# Patient Record
Sex: FEMALE | Race: WHITE | NOT HISPANIC OR LATINO | Employment: OTHER | URBAN - METROPOLITAN AREA
[De-identification: names, ages, dates, MRNs, and addresses within clinical notes are randomized per-mention and may not be internally consistent; named-entity substitution may affect disease eponyms.]

---

## 2021-08-20 ENCOUNTER — TELEPHONE (OUTPATIENT)
Dept: INTERNAL MEDICINE | Facility: CLINIC | Age: 66
End: 2021-08-20

## 2021-10-18 ENCOUNTER — TELEPHONE (OUTPATIENT)
Dept: INTERNAL MEDICINE | Facility: CLINIC | Age: 66
End: 2021-10-18

## 2021-10-18 DIAGNOSIS — E55.9 VITAMIN D DEFICIENCY: Primary | ICD-10-CM

## 2021-10-18 DIAGNOSIS — Z13.220 SCREENING CHOLESTEROL LEVEL: ICD-10-CM

## 2021-10-18 NOTE — TELEPHONE ENCOUNTER
Scheduled patient for virtual visit to establish care in January when she goes on Medicare. She would like to do labwork prior to her visit when she is in the [facility]. If agreeable, please enter orders for routine labwork.

## 2021-12-25 ENCOUNTER — HEALTH MAINTENANCE LETTER (OUTPATIENT)
Age: 66
End: 2021-12-25

## 2022-01-12 ENCOUNTER — LAB (OUTPATIENT)
Dept: LAB | Facility: CLINIC | Age: 67
End: 2022-01-12
Payer: MEDICARE

## 2022-01-12 DIAGNOSIS — E61.1 IRON DEFICIENCY: ICD-10-CM

## 2022-01-12 DIAGNOSIS — Z13.220 SCREENING CHOLESTEROL LEVEL: ICD-10-CM

## 2022-01-12 DIAGNOSIS — E55.9 VITAMIN D DEFICIENCY: ICD-10-CM

## 2022-01-12 DIAGNOSIS — K58.2 MIXED IRRITABLE BOWEL SYNDROME: ICD-10-CM

## 2022-01-12 LAB
ANION GAP SERPL CALCULATED.3IONS-SCNC: 9 MMOL/L (ref 5–18)
BUN SERPL-MCNC: 10 MG/DL (ref 8–22)
CALCIUM SERPL-MCNC: 9 MG/DL (ref 8.5–10.5)
CHLORIDE BLD-SCNC: 107 MMOL/L (ref 98–107)
CHOLEST SERPL-MCNC: 207 MG/DL
CO2 SERPL-SCNC: 23 MMOL/L (ref 22–31)
CREAT SERPL-MCNC: 0.74 MG/DL (ref 0.6–1.1)
FASTING STATUS PATIENT QL REPORTED: YES
GFR SERPL CREATININE-BSD FRML MDRD: 89 ML/MIN/1.73M2
GLUCOSE BLD-MCNC: 83 MG/DL (ref 70–125)
HDLC SERPL-MCNC: 62 MG/DL
LDLC SERPL CALC-MCNC: 128 MG/DL
POTASSIUM BLD-SCNC: 4.3 MMOL/L (ref 3.5–5)
SODIUM SERPL-SCNC: 139 MMOL/L (ref 136–145)
TRIGL SERPL-MCNC: 85 MG/DL

## 2022-01-12 PROCEDURE — 82306 VITAMIN D 25 HYDROXY: CPT

## 2022-01-12 PROCEDURE — 80061 LIPID PANEL: CPT

## 2022-01-12 PROCEDURE — 36415 COLL VENOUS BLD VENIPUNCTURE: CPT

## 2022-01-12 PROCEDURE — 84443 ASSAY THYROID STIM HORMONE: CPT

## 2022-01-12 PROCEDURE — 80048 BASIC METABOLIC PNL TOTAL CA: CPT

## 2022-01-13 LAB — DEPRECATED CALCIDIOL+CALCIFEROL SERPL-MC: 69 UG/L (ref 30–80)

## 2022-01-17 ENCOUNTER — VIRTUAL VISIT (OUTPATIENT)
Dept: INTERNAL MEDICINE | Facility: CLINIC | Age: 67
End: 2022-01-17
Payer: MEDICARE

## 2022-01-17 DIAGNOSIS — Z11.4 SCREENING FOR HUMAN IMMUNODEFICIENCY VIRUS: ICD-10-CM

## 2022-01-17 DIAGNOSIS — E55.9 VITAMIN D DEFICIENCY: ICD-10-CM

## 2022-01-17 DIAGNOSIS — I77.0 A-V FISTULA (H): ICD-10-CM

## 2022-01-17 DIAGNOSIS — E61.1 IRON DEFICIENCY: ICD-10-CM

## 2022-01-17 DIAGNOSIS — Z78.0 ASYMPTOMATIC MENOPAUSAL STATE: ICD-10-CM

## 2022-01-17 DIAGNOSIS — Z23 NEED FOR 23-POLYVALENT PNEUMOCOCCAL POLYSACCHARIDE VACCINE: ICD-10-CM

## 2022-01-17 DIAGNOSIS — Z12.31 VISIT FOR SCREENING MAMMOGRAM: ICD-10-CM

## 2022-01-17 DIAGNOSIS — K58.2 MIXED IRRITABLE BOWEL SYNDROME: ICD-10-CM

## 2022-01-17 DIAGNOSIS — Z12.11 COLON CANCER SCREENING: ICD-10-CM

## 2022-01-17 DIAGNOSIS — Z11.59 ENCOUNTER FOR HEPATITIS C SCREENING TEST FOR LOW RISK PATIENT: ICD-10-CM

## 2022-01-17 DIAGNOSIS — D64.9 ANEMIA, UNSPECIFIED TYPE: ICD-10-CM

## 2022-01-17 DIAGNOSIS — Z00.00 MEDICARE ANNUAL WELLNESS VISIT, INITIAL: Primary | ICD-10-CM

## 2022-01-17 DIAGNOSIS — G47.00 PERSISTENT DISORDER OF INITIATING OR MAINTAINING SLEEP: ICD-10-CM

## 2022-01-17 PROBLEM — H81.11 BPPV (BENIGN PAROXYSMAL POSITIONAL VERTIGO), RIGHT: Status: RESOLVED | Noted: 2021-07-01 | Resolved: 2022-01-17

## 2022-01-17 PROBLEM — Z86.73 HISTORY OF TIA (TRANSIENT ISCHEMIC ATTACK): Status: ACTIVE | Noted: 2017-07-27

## 2022-01-17 PROBLEM — R94.39 ABNORMAL STRESS TEST: Status: ACTIVE | Noted: 2017-07-31

## 2022-01-17 PROBLEM — Q24.5 ANOMALOUS RIGHT CORONARY ARTERY: Status: ACTIVE | Noted: 2021-03-19

## 2022-01-17 PROBLEM — Z86.16 HISTORY OF 2019 NOVEL CORONAVIRUS DISEASE (COVID-19): Status: ACTIVE | Noted: 2021-03-19

## 2022-01-17 PROBLEM — H81.11 BPPV (BENIGN PAROXYSMAL POSITIONAL VERTIGO), RIGHT: Status: ACTIVE | Noted: 2021-07-01

## 2022-01-17 LAB — TSH SERPL DL<=0.005 MIU/L-ACNC: 1.03 UIU/ML (ref 0.3–5)

## 2022-01-17 PROCEDURE — 99204 OFFICE O/P NEW MOD 45 MIN: CPT | Mod: 95 | Performed by: INTERNAL MEDICINE

## 2022-01-17 PROCEDURE — 99207 PR INITIAL PREVENTIVE EXAM STAT: CPT | Mod: 95 | Performed by: INTERNAL MEDICINE

## 2022-01-17 RX ORDER — MECLIZINE HYDROCHLORIDE 25 MG/1
25 TABLET ORAL
COMMUNITY
Start: 2021-06-12 | End: 2022-01-17

## 2022-01-17 RX ORDER — FERROUS SULFATE 325(65) MG
1 TABLET ORAL
COMMUNITY
Start: 1980-08-16 | End: 2022-01-17

## 2022-01-17 RX ORDER — AMITRIPTYLINE HYDROCHLORIDE 10 MG/1
10 TABLET ORAL AT BEDTIME
Qty: 10 TABLET | Refills: 0 | Status: SHIPPED | OUTPATIENT
Start: 2022-01-17 | End: 2022-02-07

## 2022-01-17 NOTE — PROGRESS NOTES
ANNUAL WELLNESS VISIT--Video    Norma is a 67 year old female contacting the clinic today via video, who will use the platform: [employer] for the visit.  Phone # for [employer], or if Amwell drops:   Telephone Information:   Mobile [phone]          Assessment and Plan:     1. Medicare annual wellness visit, initial  Reviewed vaccination.  Conservative colon cancer screening is reasonable  - REVIEW OF HEALTH MAINTENANCE PROTOCOL ORDERS  - DEXA HIP/PELVIS/SPINE - Future; Future    2. 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  Agree with conservative care.  Minimally symptomatic  - CBC with Platelets & Differential; Future  - Vitamin B12; Future    3. Vitamin D deficiency    4. Iron deficiency  Trial without iron and update indices.  Recurrent anemia would indicate different problems postmenopausal  - CBC with Platelets & Differential; Future  - TSH; Future    5. Visit for screening mammogram  - MA Screening Digital Bilateral; Future    6. Colon cancer screening  Conservative screening is reasonable  - COLOGUARD(EXACT SCIENCES); Future    7. Encounter for hepatitis C screening test for low risk patient  - Hepatitis C antibody; Future    8. Screening for human immunodeficiency virus  - HIV Antigen Antibody Combo; Future    9. Anemia, unspecified type  Recheck and stop iron  - CBC with Platelets & Differential; Future  - Ferritin; Future  - CRP inflammation; Future  - Erythrocyte sedimentation rate auto; Future    10. Asymptomatic menopausal state   - DEXA HIP/PELVIS/SPINE - Future; Future    11. Mixed irritable bowel syndrome   Possible side effect of iron or vitamins  - TSH; Future    12. Persistent disorder of initiating or maintaining sleep  - amitriptyline (ELAVIL) 10 MG tablet; Take 1 tablet (10 mg) by mouth At Bedtime for 10 days  Dispense: 10 tablet; Refill: 0    13. Need for 23-polyvalent pneumococcal polysaccharide vaccine  Recommend Pneumovax     Preventive  Care Assessed: As above     Patient Instructions   Update labs    Pneumovax 23    Amitriptyline 10 mg at bed    Discontinue iron    Check hemoglobin and iron indices    Mammogram    Bone density    Cologuard      Medication list carefully reviewed and corrected  Epic Merger Problem list addressed and updated     Return in about 6 months (around 7/17/2022) for using a video visit.    The patient's current medical problems were reviewed.    I have had an Advance Directives discussion with the patient.    I have had an Advance Directives discussion with the patient.        The following health maintenance items are reviewed in Epic and correct as of today:  Health Maintenance Due   Topic Date Due     DEXA  Never done     MAMMO SCREENING  Never done     HIV SCREENING  Never done     HEPATITIS C SCREENING  Never done     ZOSTER IMMUNIZATION (1 of 2) Never done     COLORECTAL CANCER SCREENING  09/12/2017     INFLUENZA VACCINE (1) 09/01/2021     PHQ-2  Never done     FALL RISK ASSESSMENT  Never done     Pneumococcal Vaccine: 65+ Years (1 of 1 - PPSV23) 01/12/2022       Appropriate preventive services were discussed with this patient, including applicable screening as appropriate for cardiovascular disease, diabetes, osteopenia/osteoporosis, and glaucoma.  As appropriate for age/gender, discussed screening for colorectal cancer, prostate cancer, breast cancer, and cervical cancer. Checklist reviewing preventive services available has been given to the patient.    Reviewed patients plan of care and provided an AVS. The Basic Care Plan for Norma meets the Care Plan requirement. This Care Plan has been established and reviewed with the Patient, and printed in the AVS.    The following health maintenance schedule was reviewed with the patient and provided in printed form in the after visit summary:   Health Maintenance   Topic Date Due     DEXA  Never done     MAMMO SCREENING  Never done     HIV SCREENING  Never done      HEPATITIS C SCREENING  Never done     ZOSTER IMMUNIZATION (1 of 2) Never done     COLORECTAL CANCER SCREENING  09/12/2017     INFLUENZA VACCINE (1) 09/01/2021     PHQ-2  Never done     FALL RISK ASSESSMENT  Never done     Pneumococcal Vaccine: 65+ Years (1 of 1 - PPSV23) 01/12/2022     COVID-19 Vaccine (3 - Booster for Pfizer series) 02/23/2022     DTAP/TDAP/TD IMMUNIZATION (3 - Td or Tdap) 12/11/2022     MEDICARE ANNUAL WELLNESS VISIT  01/17/2023     ANNUAL REVIEW OF HM ORDERS  01/17/2023     LIPID  01/12/2027     ADVANCE CARE PLANNING  01/17/2027     Pneumococcal Vaccine: Pediatrics (0 to 5 Years) and At-Risk Patients (6 to 64 Years)  Aged Out     IPV IMMUNIZATION  Aged Out     MENINGITIS IMMUNIZATION  Aged Out     HEPATITIS B IMMUNIZATION  Aged Out        Subjective:   Norma is a 67 year old female contacting the clinic via video today for an Annual Wellness Visit.    CHIEF COMPLAINT:  Chief Complaint   Patient presents with     Physical     Medicare wellness       HPI: Doing very well.  Needs cancer screening updated    Having some trouble with sleep maintenance and sometimes sleep onset.  Feels that she does not have much stress    Long history of irritable bowel type symptoms.  Long history of iron usage with history of rapid amine anemia but none since menopause    Underwent evaluation 2017 for abnormal stress test.  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  Managed conservatively with yearly echoes    Review of Systems: Some shortness of breath with exercise    Patient Care Team:  Mitul Aldana MD as PCP - General (Internal Medicine)     Patient Active Problem List   Diagnosis     Shoulder pain     Other postprocedural status(V45.89)     A-V fistula (H)     Abnormal stress test     Anomalous right coronary artery     History of 2019 novel coronavirus disease (COVID-19)     History of TIA (transient ischemic attack)     Iron deficiency     No past medical  history on file.   No past surgical history on file.   No family history on file.   Social History     Socioeconomic History     Marital status:      Spouse name: Not on file     Number of children: Not on file     Years of education: Not on file     Highest education level: Not on file   Occupational History     Not on file   Tobacco Use     Smoking status: Not on file     Smokeless tobacco: Not on file   Substance and Sexual Activity     Alcohol use: Not on file     Drug use: Not on file     Sexual activity: Not on file   Other Topics Concern     Not on file   Social History Narrative     Not on file     Social Determinants of Health     Financial Resource Strain: Not on file   Food Insecurity: Not on file   Transportation Needs: Not on file   Physical Activity: Not on file   Stress: Not on file   Social Connections: Not on file   Intimate Partner Violence: Not on file   Housing Stability: Not on file      Social History     Social History Narrative     Not on file       Current Outpatient Medications   Medication Sig Dispense Refill     amitriptyline (ELAVIL) 10 MG tablet Take 1 tablet (10 mg) by mouth At Bedtime for 10 days 10 tablet 0     cholecalciferol 125 MCG (5000 UT) CAPS Take 5,000 Units by mouth        Objective:   There were no vitals taken for this visit. There is no height or weight on file to calculate BMI.    VisionScreening:  No exam data present     PHYSICAL EXAM:  Alert and oriented.  Setting: In-home.    No obvious cyanoses    Recent Labs   Lab Test 01/12/22  1119   CHOL 207*   GLC 83   VITDT 69       Lab Results   Component Value Date    VITDT 69 01/12/2022       Recent Results (from the past 720 hour(s))   Lipid panel reflex to direct LDL Fasting    Collection Time: 01/12/22 11:19 AM   Result Value Ref Range    Cholesterol 207 (H) <=199 mg/dL    Triglycerides 85 <=149 mg/dL    Direct Measure HDL 62 >=50 mg/dL    LDL Cholesterol Calculated 128 <=129 mg/dL    Patient Fasting > 8hrs? Yes     Basic metabolic panel    Collection Time: 01/12/22 11:19 AM   Result Value Ref Range    Sodium 139 136 - 145 mmol/L    Potassium 4.3 3.5 - 5.0 mmol/L    Chloride 107 98 - 107 mmol/L    Carbon Dioxide (CO2) 23 22 - 31 mmol/L    Anion Gap 9 5 - 18 mmol/L    Urea Nitrogen 10 8 - 22 mg/dL    Creatinine 0.74 0.60 - 1.10 mg/dL    Calcium 9.0 8.5 - 10.5 mg/dL    Glucose 83 70 - 125 mg/dL    GFR Estimate 89 >60 mL/min/1.73m2   Vitamin D Deficiency    Collection Time: 01/12/22 11:19 AM   Result Value Ref Range    Vitamin D, Total (25-Hydroxy) 69 30 - 80 ug/L   TSH    Collection Time: 01/12/22 11:19 AM   Result Value Ref Range    TSH 1.03 0.30 - 5.00 uIU/mL       No flowsheet data found.  Cognitive Screening   1) Repeat 3 items (Leader, Season, Table)    2) Clock draw: NORMAL  3) 3 item recall: Recalls 3 objects  Results: 3 items recalled: COGNITIVE IMPAIRMENT LESS LIKELY    Mini-CogTM Copyright S Brianne. Licensed by the author for use in [facility]; reprinted with permission (josh@[facility]). All rights reserved.    A Mini-Cog score of 0-2 suggests the possibility of dementia, score of 3-5 suggests no dementia    ROOMING STAFF:    Patient has been advised of split billing requirements and indicates understanding: Yes   Are you in the first 12 months of your Medicare coverage?  yes    HPI  Do you feel safe in your environment? Yes      Have you ever done Advance Care Planning? (For example, a Health Directive, POLST, or a discussion with a medical provider or your loved ones about your wishes): Yes, advance care planning is on file.    Do you have sleep apnea, excessive snoring or daytime drowsiness?: no    Reviewed and updated as needed this visit by clinical staff    Meds  Problems              Video Start Time: 9:46 AM  Video End time:  10:43 AM  Face to face plus orders: 57 minutes  Documentation time:  3 minutes    The visit lasted a total of 60 minutes     Patient has given verbal consent to a Video  visit?  Yes  How would you like to obtain your AVS?  MyChart  Will anyone else be joining your video visit? No       Video-Visit Details  Type of service:  Video Visit  Originating Location (pt. Location): Home  Distant Location (provider location):    [facility]    Mitul Aldana MD  [facility]

## 2022-01-17 NOTE — PATIENT INSTRUCTIONS
Update labs    Pneumovax 23    Amitriptyline 10 mg at bed    Discontinue iron    Check hemoglobin and iron indices    Mammogram    Bone density    Cologuard

## 2022-01-18 NOTE — PROGRESS NOTES
This encounter was created solely for the purpose of releasing the future order that was placed for Cologuard.  This is a necessary step in order for the results to be abstracted once they are available.  Farhat Arevalo

## 2022-01-24 ENCOUNTER — TRANSFERRED RECORDS (OUTPATIENT)
Dept: HEALTH INFORMATION MANAGEMENT | Facility: CLINIC | Age: 67
End: 2022-01-24
Payer: MEDICARE

## 2022-01-24 LAB — COLOGUARD-ABSTRACT: NEGATIVE

## 2022-02-18 ENCOUNTER — ANCILLARY PROCEDURE (OUTPATIENT)
Dept: BONE DENSITY | Facility: CLINIC | Age: 67
End: 2022-02-18
Attending: INTERNAL MEDICINE
Payer: MEDICARE

## 2022-02-18 ENCOUNTER — ALLIED HEALTH/NURSE VISIT (OUTPATIENT)
Dept: FAMILY MEDICINE | Facility: CLINIC | Age: 67
End: 2022-02-18
Payer: MEDICARE

## 2022-02-18 ENCOUNTER — ANCILLARY PROCEDURE (OUTPATIENT)
Dept: MAMMOGRAPHY | Facility: CLINIC | Age: 67
End: 2022-02-18
Attending: INTERNAL MEDICINE
Payer: MEDICARE

## 2022-02-18 DIAGNOSIS — Z23 ENCOUNTER FOR IMMUNIZATION: Primary | ICD-10-CM

## 2022-02-18 DIAGNOSIS — Z78.0 ASYMPTOMATIC MENOPAUSAL STATE: ICD-10-CM

## 2022-02-18 DIAGNOSIS — Z12.31 VISIT FOR SCREENING MAMMOGRAM: ICD-10-CM

## 2022-02-18 DIAGNOSIS — Z00.00 MEDICARE ANNUAL WELLNESS VISIT, INITIAL: ICD-10-CM

## 2022-02-18 PROCEDURE — 90670 PCV13 VACCINE IM: CPT

## 2022-02-18 PROCEDURE — G0009 ADMIN PNEUMOCOCCAL VACCINE: HCPCS

## 2022-02-18 PROCEDURE — 77080 DXA BONE DENSITY AXIAL: CPT | Performed by: INTERNAL MEDICINE

## 2022-02-18 PROCEDURE — 99207 PR NO CHARGE NURSE ONLY: CPT

## 2022-02-18 PROCEDURE — 77067 SCR MAMMO BI INCL CAD: CPT | Mod: TC | Performed by: RADIOLOGY

## 2022-02-19 ENCOUNTER — HEALTH MAINTENANCE LETTER (OUTPATIENT)
Age: 67
End: 2022-02-19

## 2022-05-18 DIAGNOSIS — L82.0 INFLAMED SEBORRHEIC KERATOSIS: ICD-10-CM

## 2022-05-18 DIAGNOSIS — D22.70 MELANOCYTIC NEVUS OF LOWER EXTREMITY, UNSPECIFIED LATERALITY: Primary | ICD-10-CM

## 2022-07-20 DIAGNOSIS — L23.7 CONTACT DERMATITIS DUE TO POISON IVY: Primary | ICD-10-CM

## 2022-07-20 RX ORDER — CLOBETASOL PROPIONATE 0.5 MG/G
CREAM TOPICAL 2 TIMES DAILY
Qty: 30 G | Refills: 1 | Status: SHIPPED | OUTPATIENT
Start: 2022-07-20 | End: 2023-07-18

## 2022-09-07 ENCOUNTER — OFFICE VISIT (OUTPATIENT)
Dept: DERMATOLOGY | Facility: CLINIC | Age: 67
End: 2022-09-07
Attending: INTERNAL MEDICINE
Payer: MEDICARE

## 2022-09-07 DIAGNOSIS — D23.9 DERMATOFIBROMA: ICD-10-CM

## 2022-09-07 DIAGNOSIS — L82.1 SEBORRHEIC KERATOSIS: Primary | ICD-10-CM

## 2022-09-07 DIAGNOSIS — D22.9 MULTIPLE BENIGN NEVI: ICD-10-CM

## 2022-09-07 DIAGNOSIS — L81.4 LENTIGO: ICD-10-CM

## 2022-09-07 DIAGNOSIS — D18.01 CHERRY ANGIOMA: ICD-10-CM

## 2022-09-07 PROCEDURE — 99203 OFFICE O/P NEW LOW 30 MIN: CPT | Performed by: PHYSICIAN ASSISTANT

## 2022-09-07 ASSESSMENT — PAIN SCALES - GENERAL: PAINLEVEL: NO PAIN (0)

## 2022-09-07 NOTE — LETTER
9/7/2022         RE: Norma Espinosa  [address]  [place] [address]        Dear Colleague,    Thank you for referring your patient, Norma Espinosa, to the [facility]. Please see a copy of my visit note below.    Norma Espinosa is an extremely pleasant 67 year old year old female patient here today for skin check. She notes newer spots on legs over past years. No painful or bleeding areas of skin. She denies any tanning bed usage or blistering sunburns. She does use sunscreen. Patient has no other skin complaints today.  Remainder of the HPI, Meds, PMH, Allergies, FH, and SH was reviewed in chart.    Pertinent Hx:   No personal history of skin cancer. Family history of skin cancer.   No past medical history on file.    No past surgical history on file.     No family history on file.    Social History     Socioeconomic History     Marital status:      Spouse name: Not on file     Number of children: Not on file     Years of education: Not on file     Highest education level: Not on file   Occupational History     Not on file   Tobacco Use     Smoking status: Never Smoker     Smokeless tobacco: Never Used   Substance and Sexual Activity     Alcohol use: Not on file     Drug use: Not on file     Sexual activity: Not on file   Other Topics Concern     Not on file   Social History Narrative     Not on file     Social Determinants of Health     Financial Resource Strain: Not on file   Food Insecurity: Not on file   Transportation Needs: Not on file   Physical Activity: Not on file   Stress: Not on file   Social Connections: Not on file   Intimate Partner Violence: Not on file   Housing Stability: Not on file       Outpatient Encounter Medications as of 9/7/2022   Medication Sig Dispense Refill     amitriptyline (ELAVIL) 10 MG tablet Take 1 tablet (10 mg) by mouth At Bedtime 90 tablet 3     cholecalciferol 125 MCG (5000 UT) CAPS Take 5,000 Units by mouth       clobetasol  (TEMOVATE) 0.05 % external cream Apply topically 2 times daily (Patient not taking: Reported on 9/7/2022) 30 g 1     No facility-administered encounter medications on file as of 9/7/2022.             O:   NAD, WDWN, Alert & Oriented, Mood & Affect wnl, Vitals stable   Here today alone   There were no vitals taken for this visit.   General appearance normal   Vitals stable   Alert, oriented and in no acute distress     Brown firm papule with positive dimple sign on left thigh   Stuck on papules and brown macules on trunk and ext   Red papules on trunk  Brown papules and macules with regular pigment network and borders on torso and extremities     The remainder of skin exam is normal     Eyes: Conjunctivae/lids:Normal     ENT: Lips: normal    MSK:Normal    Cardiovascular: peripheral edema none    Pulm: Breathing Normal    Neuro/Psych: Orientation:Alert and Orientedx3 ; Mood/Affect:normal   A/P:  1. Seborrheic keratosis, lentigo, angioma, benign nevi, dermatofibroma   It was a pleasure speaking to Norma Espinosa today.  BENIGN LESIONS DISCUSSED WITH PATIENT:  I discussed the specifics of tumor, prognosis, and genetics of benign lesions.  I explained that treatment of these lesions would be purely cosmetic and not medically neccessary.  I discussed with patient different removal options including excision, cautery and /or laser.      Nature and genetics of benign skin lesions dicussed with patient.  Signs and Symptoms of skin cancer discussed with patient.  ABCDEs of melanoma reviewed with patient.  Patient encouraged to perform monthly skin exams.  UV precautions reviewed with patient.  Patient to follow up with Primary Care provider regarding elevated blood pressure.  Skin care regimen reviewed with patient: Eliminate harsh soaps, i.e. Dial, zest, irsih spring; Mild soaps such as Cetaphil or Dove sensitive skin, avoid hot or cold showers, aggressive use of emollients including vanicream, cetaphil or cerave discussed  with patient.    Risks of non-melanoma skin cancer discussed with patient   Return to clinic in one year or sooner if needed.       Again, thank you for allowing me to participate in the care of your patient.        Sincerely,        Nena Patricia PA-C

## 2022-09-07 NOTE — PROGRESS NOTES
Norma Espinosa is an extremely pleasant 67 year old year old female patient here today for skin check. She notes newer spots on legs over past years. No painful or bleeding areas of skin. She denies any tanning bed usage or blistering sunburns. She does use sunscreen. Patient has no other skin complaints today.  Remainder of the HPI, Meds, PMH, Allergies, FH, and SH was reviewed in chart.    Pertinent Hx:   No personal history of skin cancer. Family history of skin cancer.   No past medical history on file.    No past surgical history on file.     No family history on file.    Social History     Socioeconomic History     Marital status:      Spouse name: Not on file     Number of children: Not on file     Years of education: Not on file     Highest education level: Not on file   Occupational History     Not on file   Tobacco Use     Smoking status: Never Smoker     Smokeless tobacco: Never Used   Substance and Sexual Activity     Alcohol use: Not on file     Drug use: Not on file     Sexual activity: Not on file   Other Topics Concern     Not on file   Social History Narrative     Not on file     Social Determinants of Health     Financial Resource Strain: Not on file   Food Insecurity: Not on file   Transportation Needs: Not on file   Physical Activity: Not on file   Stress: Not on file   Social Connections: Not on file   Intimate Partner Violence: Not on file   Housing Stability: Not on file       Outpatient Encounter Medications as of 9/7/2022   Medication Sig Dispense Refill     amitriptyline (ELAVIL) 10 MG tablet Take 1 tablet (10 mg) by mouth At Bedtime 90 tablet 3     cholecalciferol 125 MCG (5000 UT) CAPS Take 5,000 Units by mouth       clobetasol (TEMOVATE) 0.05 % external cream Apply topically 2 times daily (Patient not taking: Reported on 9/7/2022) 30 g 1     No facility-administered encounter medications on file as of 9/7/2022.             O:   NAD, WDWN, Alert & Oriented, Mood & Affect wnl,  Vitals stable   Here today alone   There were no vitals taken for this visit.   General appearance normal   Vitals stable   Alert, oriented and in no acute distress     Brown firm papule with positive dimple sign on left thigh   Stuck on papules and brown macules on trunk and ext   Red papules on trunk  Brown papules and macules with regular pigment network and borders on torso and extremities     The remainder of skin exam is normal     Eyes: Conjunctivae/lids:Normal     ENT: Lips: normal    MSK:Normal    Cardiovascular: peripheral edema none    Pulm: Breathing Normal    Neuro/Psych: Orientation:Alert and Orientedx3 ; Mood/Affect:normal   A/P:  1. Seborrheic keratosis, lentigo, angioma, benign nevi, dermatofibroma   It was a pleasure speaking to Norma Espinosa today.  BENIGN LESIONS DISCUSSED WITH PATIENT:  I discussed the specifics of tumor, prognosis, and genetics of benign lesions.  I explained that treatment of these lesions would be purely cosmetic and not medically neccessary.  I discussed with patient different removal options including excision, cautery and /or laser.      Nature and genetics of benign skin lesions dicussed with patient.  Signs and Symptoms of skin cancer discussed with patient.  ABCDEs of melanoma reviewed with patient.  Patient encouraged to perform monthly skin exams.  UV precautions reviewed with patient.  Patient to follow up with Primary Care provider regarding elevated blood pressure.  Skin care regimen reviewed with patient: Eliminate harsh soaps, i.e. Dial, zest, irsih spring; Mild soaps such as Cetaphil or Dove sensitive skin, avoid hot or cold showers, aggressive use of emollients including vanicream, cetaphil or cerave discussed with patient.    Risks of non-melanoma skin cancer discussed with patient   Return to clinic in one year or sooner if needed.

## 2022-10-22 ENCOUNTER — HEALTH MAINTENANCE LETTER (OUTPATIENT)
Age: 67
End: 2022-10-22

## 2023-04-01 ENCOUNTER — HEALTH MAINTENANCE LETTER (OUTPATIENT)
Age: 68
End: 2023-04-01

## 2023-05-11 ENCOUNTER — ANCILLARY PROCEDURE (OUTPATIENT)
Dept: MAMMOGRAPHY | Facility: CLINIC | Age: 68
End: 2023-05-11
Attending: INTERNAL MEDICINE
Payer: MEDICARE

## 2023-05-11 DIAGNOSIS — Z12.31 VISIT FOR SCREENING MAMMOGRAM: ICD-10-CM

## 2023-05-11 PROCEDURE — 77067 SCR MAMMO BI INCL CAD: CPT | Mod: TC | Performed by: STUDENT IN AN ORGANIZED HEALTH CARE EDUCATION/TRAINING PROGRAM

## 2023-05-25 ENCOUNTER — ANCILLARY PROCEDURE (OUTPATIENT)
Dept: ULTRASOUND IMAGING | Facility: CLINIC | Age: 68
End: 2023-05-25
Attending: INTERNAL MEDICINE
Payer: MEDICARE

## 2023-05-25 ENCOUNTER — ANCILLARY PROCEDURE (OUTPATIENT)
Dept: MAMMOGRAPHY | Facility: CLINIC | Age: 68
End: 2023-05-25
Attending: INTERNAL MEDICINE
Payer: MEDICARE

## 2023-05-25 DIAGNOSIS — R92.8 ABNORMAL MAMMOGRAM: ICD-10-CM

## 2023-05-25 PROCEDURE — G0279 TOMOSYNTHESIS, MAMMO: HCPCS

## 2023-05-25 PROCEDURE — 76642 ULTRASOUND BREAST LIMITED: CPT | Mod: LT

## 2023-05-25 PROCEDURE — 77065 DX MAMMO INCL CAD UNI: CPT | Mod: LT

## 2023-07-17 ASSESSMENT — ENCOUNTER SYMPTOMS
MYALGIAS: 0
HEADACHES: 0
CHILLS: 0
SORE THROAT: 0
DIZZINESS: 0
COUGH: 0
HEMATURIA: 0
NERVOUS/ANXIOUS: 0
ARTHRALGIAS: 1
PALPITATIONS: 0
FREQUENCY: 0
HEMATOCHEZIA: 0
NAUSEA: 0
PARESTHESIAS: 0
ABDOMINAL PAIN: 0
FEVER: 0
HEARTBURN: 0
DYSURIA: 0
DIARRHEA: 0
EYE PAIN: 0
SHORTNESS OF BREATH: 1
BREAST MASS: 0
CONSTIPATION: 0
JOINT SWELLING: 1
WEAKNESS: 0

## 2023-07-17 ASSESSMENT — ACTIVITIES OF DAILY LIVING (ADL): CURRENT_FUNCTION: NO ASSISTANCE NEEDED

## 2023-07-18 ENCOUNTER — OFFICE VISIT (OUTPATIENT)
Dept: INTERNAL MEDICINE | Facility: CLINIC | Age: 68
End: 2023-07-18
Payer: MEDICARE

## 2023-07-18 VITALS
TEMPERATURE: 97.9 F | BODY MASS INDEX: 23.98 KG/M2 | DIASTOLIC BLOOD PRESSURE: 71 MMHG | HEIGHT: 66 IN | SYSTOLIC BLOOD PRESSURE: 138 MMHG | WEIGHT: 149.2 LBS | RESPIRATION RATE: 16 BRPM | HEART RATE: 69 BPM | OXYGEN SATURATION: 100 %

## 2023-07-18 DIAGNOSIS — R06.09 DYSPNEA ON EXERTION: ICD-10-CM

## 2023-07-18 DIAGNOSIS — Z23 ENCOUNTER FOR IMMUNIZATION: ICD-10-CM

## 2023-07-18 DIAGNOSIS — Z00.00 PREVENTATIVE HEALTH CARE: Primary | ICD-10-CM

## 2023-07-18 DIAGNOSIS — Z13.6 ENCOUNTER FOR SCREENING FOR CARDIOVASCULAR DISORDERS: ICD-10-CM

## 2023-07-18 DIAGNOSIS — M19.042 PRIMARY OSTEOARTHRITIS OF BOTH HANDS: ICD-10-CM

## 2023-07-18 DIAGNOSIS — M19.041 PRIMARY OSTEOARTHRITIS OF BOTH HANDS: ICD-10-CM

## 2023-07-18 DIAGNOSIS — I77.0 A-V FISTULA (H): ICD-10-CM

## 2023-07-18 DIAGNOSIS — Z23 NEED FOR DIPHTHERIA-TETANUS-PERTUSSIS (TDAP) VACCINE: ICD-10-CM

## 2023-07-18 DIAGNOSIS — Z12.11 COLON CANCER SCREENING: ICD-10-CM

## 2023-07-18 DIAGNOSIS — Q24.5 ANOMALOUS RIGHT CORONARY ARTERY: ICD-10-CM

## 2023-07-18 DIAGNOSIS — Z23 NEED FOR SHINGLES VACCINE: ICD-10-CM

## 2023-07-18 DIAGNOSIS — Z11.59 NEED FOR HEPATITIS C SCREENING TEST: ICD-10-CM

## 2023-07-18 LAB
ALBUMIN SERPL BCG-MCNC: 4.9 G/DL (ref 3.5–5.2)
ALP SERPL-CCNC: 73 U/L (ref 35–104)
ALT SERPL W P-5'-P-CCNC: 17 U/L (ref 0–50)
ANION GAP SERPL CALCULATED.3IONS-SCNC: 11 MMOL/L (ref 7–15)
AST SERPL W P-5'-P-CCNC: 28 U/L (ref 0–45)
BASOPHILS # BLD AUTO: 0 10E3/UL (ref 0–0.2)
BASOPHILS NFR BLD AUTO: 0 %
BILIRUB SERPL-MCNC: 0.5 MG/DL
BUN SERPL-MCNC: 9.4 MG/DL (ref 8–23)
CALCIUM SERPL-MCNC: 9.3 MG/DL (ref 8.8–10.2)
CHLORIDE SERPL-SCNC: 104 MMOL/L (ref 98–107)
CHOLEST SERPL-MCNC: 215 MG/DL
CREAT SERPL-MCNC: 0.69 MG/DL (ref 0.51–0.95)
DEPRECATED HCO3 PLAS-SCNC: 24 MMOL/L (ref 22–29)
EOSINOPHIL # BLD AUTO: 0.1 10E3/UL (ref 0–0.7)
EOSINOPHIL NFR BLD AUTO: 2 %
ERYTHROCYTE [DISTWIDTH] IN BLOOD BY AUTOMATED COUNT: 12.3 % (ref 10–15)
GFR SERPL CREATININE-BSD FRML MDRD: >90 ML/MIN/1.73M2
GLUCOSE SERPL-MCNC: 87 MG/DL (ref 70–99)
HCT VFR BLD AUTO: 43 % (ref 35–47)
HCV AB SERPL QL IA: NONREACTIVE
HDLC SERPL-MCNC: 70 MG/DL
HGB BLD-MCNC: 14 G/DL (ref 11.7–15.7)
IMM GRANULOCYTES # BLD: 0 10E3/UL
IMM GRANULOCYTES NFR BLD: 0 %
LDLC SERPL CALC-MCNC: 126 MG/DL
LYMPHOCYTES # BLD AUTO: 1.3 10E3/UL (ref 0.8–5.3)
LYMPHOCYTES NFR BLD AUTO: 29 %
MCH RBC QN AUTO: 30.4 PG (ref 26.5–33)
MCHC RBC AUTO-ENTMCNC: 32.6 G/DL (ref 31.5–36.5)
MCV RBC AUTO: 94 FL (ref 78–100)
MONOCYTES # BLD AUTO: 0.3 10E3/UL (ref 0–1.3)
MONOCYTES NFR BLD AUTO: 7 %
NEUTROPHILS # BLD AUTO: 2.9 10E3/UL (ref 1.6–8.3)
NEUTROPHILS NFR BLD AUTO: 63 %
NONHDLC SERPL-MCNC: 145 MG/DL
PLATELET # BLD AUTO: 202 10E3/UL (ref 150–450)
POTASSIUM SERPL-SCNC: 3.9 MMOL/L (ref 3.4–5.3)
PROT SERPL-MCNC: 7.3 G/DL (ref 6.4–8.3)
RBC # BLD AUTO: 4.6 10E6/UL (ref 3.8–5.2)
SODIUM SERPL-SCNC: 139 MMOL/L (ref 136–145)
TRIGL SERPL-MCNC: 95 MG/DL
TSH SERPL DL<=0.005 MIU/L-ACNC: 2.18 UIU/ML (ref 0.3–4.2)
URATE SERPL-MCNC: 4.8 MG/DL (ref 2.4–5.7)
VIT B12 SERPL-MCNC: 186 PG/ML (ref 232–1245)
WBC # BLD AUTO: 4.6 10E3/UL (ref 4–11)

## 2023-07-18 PROCEDURE — 80061 LIPID PANEL: CPT | Performed by: INTERNAL MEDICINE

## 2023-07-18 PROCEDURE — 84550 ASSAY OF BLOOD/URIC ACID: CPT | Performed by: INTERNAL MEDICINE

## 2023-07-18 PROCEDURE — G0438 PPPS, INITIAL VISIT: HCPCS | Performed by: INTERNAL MEDICINE

## 2023-07-18 PROCEDURE — 99214 OFFICE O/P EST MOD 30 MIN: CPT | Mod: 25 | Performed by: INTERNAL MEDICINE

## 2023-07-18 PROCEDURE — 86803 HEPATITIS C AB TEST: CPT | Performed by: INTERNAL MEDICINE

## 2023-07-18 PROCEDURE — 84443 ASSAY THYROID STIM HORMONE: CPT | Performed by: INTERNAL MEDICINE

## 2023-07-18 PROCEDURE — 80053 COMPREHEN METABOLIC PANEL: CPT | Performed by: INTERNAL MEDICINE

## 2023-07-18 PROCEDURE — 82607 VITAMIN B-12: CPT | Performed by: INTERNAL MEDICINE

## 2023-07-18 PROCEDURE — 85025 COMPLETE CBC W/AUTO DIFF WBC: CPT | Performed by: INTERNAL MEDICINE

## 2023-07-18 PROCEDURE — 36415 COLL VENOUS BLD VENIPUNCTURE: CPT | Performed by: INTERNAL MEDICINE

## 2023-07-18 ASSESSMENT — ENCOUNTER SYMPTOMS
CONSTIPATION: 0
HEADACHES: 0
PALPITATIONS: 0
ARTHRALGIAS: 1
DIZZINESS: 0
MYALGIAS: 0
EYE PAIN: 0
WEAKNESS: 0
DYSURIA: 0
SHORTNESS OF BREATH: 1
ABDOMINAL PAIN: 0
PARESTHESIAS: 0
FREQUENCY: 0
HEMATURIA: 0
HEARTBURN: 0
FEVER: 0
HEMATOCHEZIA: 0
CHILLS: 0
SORE THROAT: 0
DIARRHEA: 0
NERVOUS/ANXIOUS: 0
BREAST MASS: 0
NAUSEA: 0
COUGH: 0
JOINT SWELLING: 1

## 2023-07-18 ASSESSMENT — PAIN SCALES - GENERAL: PAINLEVEL: NO PAIN (0)

## 2023-07-18 ASSESSMENT — ACTIVITIES OF DAILY LIVING (ADL): CURRENT_FUNCTION: NO ASSISTANCE NEEDED

## 2023-07-18 NOTE — PATIENT INSTRUCTIONS
Mammogram up to date    Colon up to date    Bone density in 2025    Shingrix shot consider at pharmacy    Cholesterol and glucose and hep c for preventive care    Other labs for breathing, labs for arthritis and gout and labs for nerve pain    You have a living will    Most common arthritis and easiest to treat is uric acid related pain, similar to gout.  Question is whether any uric acid is contributing to any arthritis pain.  Try allopurinol for a few weeks if over 5 and see    Most common pain on bottom of feet is plantar fascittis, treated with arch supports and sometimes injection.  You may need a podiatrist    Monitor pulse and blood pressure at rest, and immediately after exercise, and any time you feel short of breath.  It can be a manifestation of high blood pressure    [facility] neurology at [phone] to see dr adilia fleming.  Try the 20 mg lexapro    Are there meds that help a shunt?  To slow the leak such as bp meds

## 2023-07-18 NOTE — PROGRESS NOTES
ANNUAL WELLNESS VISIT--Face to Face    Assessment and Plan:     ASSESSMENT and PLAN:  1. Preventative health care  - REVIEW OF HEALTH MAINTENANCE PROTOCOL ORDERS  - Pneumococcal 20 Valent Conjugate (PCV20); Future  - Lipid Profile; Future  - Lipid Profile    2. Dyspnea on exertion  With anomalous coronary artery.  Rule out other causes then consider medications to reduce shunting  - CBC with Platelets & Differential; Future  - Comprehensive metabolic panel; Future  - Vitamin B12; Future  - TSH; Future  - CBC with Platelets & Differential  - Comprehensive metabolic panel  - Vitamin B12  - TSH    3. Need for hepatitis C screening test  - Hepatitis C Screen Reflex to HCV RNA Quant and Genotype; Future  - Hepatitis C Screen Reflex to HCV RNA Quant and Genotype    4. Colon cancer screening    5. A-V fistula (H)  Anomalous coronary artery reviewed  - Lipid Profile; Future  - Lipid Profile    6. Primary osteoarthritis of both hands  - Uric acid; Future  - Uric acid    7. Need for shingles vaccine  - zoster vaccine recombinant adjuvanted (SHINGRIX) injection; Inject 0.5 mLs into the muscle once for 1 dose Pharmacist administered  Dispense: 0.5 mL; Refill: 0    8. Need for diphtheria-tetanus-pertussis (Tdap) vaccine  - Tdap, tetanus-diptheria-acell pertussis, (BOOSTRIX) 5-2.5-18.5 LF-MCG/0.5 MCKENZIE injection; Inject 0.5 mLs into the muscle once for 1 dose  Dispense: 0.5 mL; Refill: 0    9. Encounter for screening for cardiovascular disorders  - Lipid Profile; Future  - Lipid Profile    10. Encounter for immunization  - Pneumococcal 20 Valent Conjugate (PCV20); Future    11. Anomalous right coronary artery  Reviewed angiogram       Patient Instructions   Mammogram up to date    Colon up to date    Bone density in 2025    Shingrix shot consider at pharmacy    Cholesterol and glucose and hep c for preventive care    Other labs for breathing, labs for arthritis and gout and labs for nerve pain    You have a living will    Most  common arthritis and easiest to treat is uric acid related pain, similar to gout.  Question is whether any uric acid is contributing to any arthritis pain.  Try allopurinol for a few weeks if over 5 and see    Most common pain on bottom of feet is plantar fascittis, treated with arch supports and sometimes injection.  You may need a podiatrist    Monitor pulse and blood pressure at rest, and immediately after exercise, and any time you feel short of breath.  It can be a manifestation of high blood pressure    [facility] at [phone] to see dr adilia fleming.  Try the 20 mg lexapro    Are there meds that help a shunt?  To slow the leak such as bp meds            Return in about 1 year (around 7/18/2024) for using a video visit.         Subjective:   Norma is a 68 year old female who presents to the clinic today for an Annual Wellness Visit.        7/18/2023     9:34 AM   Additional Questions   Roomed by deedee         7/18/2023     9:34 AM   Patient Reported Additional Medications   Patient reports taking the following new medications no       CHIEF COMPLAINT:  Chief Complaint   Patient presents with     Annual Visit     Recheck Medication     Pt is here for annual wellness        HPI: Underwent evaluation for palpitations and shortness of breath 5 years ago.  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  Recently feels that her breathing has become worse on exertion.  No trouble at rest    Occasional tingling and numbness    Increase stress with the progressing illness of her     Review of Systems   Constitutional: Negative for chills and fever.   HENT: Negative for congestion, ear pain, hearing loss and sore throat.    Eyes: Negative for pain and visual disturbance.   Respiratory: Positive for shortness of breath. Negative for cough.    Cardiovascular: Negative for chest pain, palpitations and peripheral edema.    Gastrointestinal: Negative for abdominal pain, constipation, diarrhea, heartburn, hematochezia and nausea.   Breasts:  Negative for tenderness, breast mass and discharge.   Genitourinary: Negative for dysuria, frequency, genital sores, hematuria, pelvic pain, urgency, vaginal bleeding and vaginal discharge.   Musculoskeletal: Positive for arthralgias and joint swelling. Negative for myalgias.   Skin: Negative for rash.   Neurological: Negative for dizziness, weakness, headaches and paresthesias.   Psychiatric/Behavioral: Negative for mood changes. The patient is not nervous/anxious.      Review of Systems: Arthritis of hands.  Arthritis of feet    Patient Care Team:  Mitul Aldana MD as PCP - General (Internal Medicine)  Mitul Aldana MD as Assigned PCP  Mitul Aldana MD as Referring Physician (Internal Medicine)  Nena Scruggs PA-C as Physician Assistant (Dermatology)     Patient Active Problem List   Diagnosis     Shoulder pain     A-V fistula (H)     Abnormal stress test     Anomalous right coronary artery     History of 2019 novel coronavirus disease (COVID-19)     History of TIA (transient ischemic attack)     Iron deficiency     No past medical history on file.   No past surgical history on file.   No family history on file.   Social History     Socioeconomic History     Marital status:      Spouse name: Not on file     Number of children: Not on file     Years of education: Not on file     Highest education level: Not on file   Occupational History     Not on file   Tobacco Use     Smoking status: Never     Smokeless tobacco: Never   Substance and Sexual Activity     Alcohol use: Not on file     Drug use: Not on file     Sexual activity: Not on file   Other Topics Concern     Not on file   Social History Narrative     Not on file     Social Determinants of Health     Financial Resource Strain: Not on file   Food Insecurity: Not on file   Transportation Needs: Not on  "file   Physical Activity: Not on file   Stress: Not on file   Social Connections: Not on file   Intimate Partner Violence: Not on file   Housing Stability: Not on file        Current Outpatient Medications   Medication Sig Dispense Refill     cholecalciferol 125 MCG (5000 UT) CAPS Take 5,000 Units by mouth       Tdap, tetanus-diptheria-acell pertussis, (BOOSTRIX) 5-2.5-18.5 LF-MCG/0.5 MCKENZIE injection Inject 0.5 mLs into the muscle once for 1 dose 0.5 mL 0     zoster vaccine recombinant adjuvanted (SHINGRIX) injection Inject 0.5 mLs into the muscle once for 1 dose Pharmacist administered 0.5 mL 0      Objective:   /71 (BP Location: Left arm, Patient Position: Sitting, Cuff Size: Adult Regular)   Pulse 69   Temp 97.9  F (36.6  C) (Oral)   Resp 16   Ht 1.67 m (5' 5.75\")   Wt 67.7 kg (149 lb 3.2 oz)   LMP  (LMP Unknown)   SpO2 100%   BMI 24.27 kg/m   Estimated body mass index is 24.27 kg/m  as calculated from the following:    Height as of this encounter: 1.67 m (5' 5.75\").    Weight as of this encounter: 67.7 kg (149 lb 3.2 oz).    VisionScreening:  No results found.     PHYSICAL EXAM:  Wearing mask when entering room?  No  Constitutional:  Reveals pleasant trim woman who ambulates without assistance  Palpation of radial pulse regular   Ears:  Clear without wax   Thyroid:  Non palpable   Cardiac:  Regular rate and rhythm with 2/6 systolic murmur  Lungs: Clear.  Respiratory effort normal.  Edema of Legs: None   Psychiatric:  Alert and oriented       Recent Labs   Lab Test 01/12/22  1119   CHOL 207*   GLC 83   VITDT 69            No data to display              Cognitive Screening   Completely normal to conversational questioning      ROOMING STAFF:    Patient has been advised of split billing requirements and indicates understanding: Yes   Are you in the first 12 months of your Medicare coverage?  No      Do you feel safe in your environment? Yes      Have you ever done Advance Care Planning? (For example, " "a Health Directive, POLST, or a discussion with a medical provider or your loved ones about your wishes): Yes, advance care planning is on file.    Healthy Habits:     In general, how would you rate your overall health?  Excellent    Frequency of exercise:  2-3 days/week    Duration of exercise:  15-30 minutes    Do you usually eat at least 4 servings of fruit and vegetables a day, include whole grains    & fiber and avoid regularly eating high fat or \"junk\" foods?  Yes    Taking medications regularly:  Yes    Medication side effects:  None    Ability to successfully perform activities of daily living:  No assistance needed    Home Safety:  No safety concerns identified    Hearing Impairment:  No hearing concerns    In the past 6 months, have you been bothered by leaking of urine?  No    In general, how would you rate your overall mental or emotional health?  Excellent    Additional concerns today:  No      Do you have sleep apnea, excessive snoring or daytime drowsiness?: no    The patient's current medical problems were reviewed.    QUALITY MEASURES:  I have had an Advance Directives discussion with the patient.    Do you have a current opioid prescription? No  Do you use any other controlled substances or medications that are not prescribed by a provider? None        The following health maintenance items are reviewed in Epic and correct as of today:  Health Maintenance Due   Topic Date Due     ZOSTER IMMUNIZATION (1 of 2) Never done     COVID-19 Vaccine (3 - Pfizer series) 11/18/2021     Pneumococcal Vaccine: 65+ Years (2 - PPSV23 if available, else PCV20) 04/15/2022     DTAP/TDAP/TD IMMUNIZATION (3 - Td or Tdap) 12/11/2022       Appropriate preventive services were discussed with this patient, including applicable screening as appropriate for cardiovascular disease, diabetes, osteopenia/osteoporosis, and glaucoma.  As appropriate for age/gender, discussed screening for colorectal cancer, prostate cancer, breast " cancer, and cervical cancer. Checklist reviewing preventive services available has been given to the patient.    Reviewed patients plan of care and provided an AVS. The Basic Care Plan for Norma meets the Care Plan requirement. This Care Plan has been established and reviewed with the Patient, and printed in the AVS.    The following health maintenance schedule was reviewed with the patient and provided in printed form in the after visit summary:   Health Maintenance   Topic Date Due     ZOSTER IMMUNIZATION (1 of 2) Never done     COVID-19 Vaccine (3 - Pfizer series) 11/18/2021     Pneumococcal Vaccine: 65+ Years (2 - PPSV23 if available, else PCV20) 04/15/2022     DTAP/TDAP/TD IMMUNIZATION (3 - Td or Tdap) 12/11/2022     INFLUENZA VACCINE (1) 09/01/2023     MEDICARE ANNUAL WELLNESS VISIT  07/18/2024     ANNUAL REVIEW OF HM ORDERS  07/18/2024     FALL RISK ASSESSMENT  07/18/2024     COLORECTAL CANCER SCREENING  01/24/2025     DEXA  02/18/2025     MAMMO SCREENING  05/25/2025     LIPID  07/18/2028     ADVANCE CARE PLANNING  07/18/2028     HEPATITIS C SCREENING  Completed     PHQ-2 (once per calendar year)  Completed     IPV IMMUNIZATION  Aged Out     MENINGITIS IMMUNIZATION  Aged Out        Start Time:10:11 AM  End time:  10:54 AM  Face to face plus orders:  33 minutes  Documentation time:  3 minutes    The visit lasted a total of 36 minutes     Reviewed and updated as needed this visit by clinical staff   Tobacco   Meds  Problems             Mitul Aldana MD  [facility]

## 2023-07-18 NOTE — PROGRESS NOTES
"SUBJECTIVE:   Norma is a 68 year old who presents for Preventive Visit.      7/18/2023     9:34 AM   Additional Questions   Roomed by deedee         7/18/2023     9:34 AM   Patient Reported Additional Medications   Patient reports taking the following new medications no     Are you in the first 12 months of your Medicare coverage?  No    Healthy Habits:     In general, how would you rate your overall health?  Excellent    Frequency of exercise:  2-3 days/week    Duration of exercise:  15-30 minutes    Do you usually eat at least 4 servings of fruit and vegetables a day, include whole grains    & fiber and avoid regularly eating high fat or \"junk\" foods?  Yes    Taking medications regularly:  Yes    Medication side effects:  None    Ability to successfully perform activities of daily living:  No assistance needed    Home Safety:  No safety concerns identified    Hearing Impairment:  No hearing concerns    In the past 6 months, have you been bothered by leaking of urine?  No    In general, how would you rate your overall mental or emotional health?  Excellent    Additional concerns today:  No        Have you ever done Advance Care Planning? (For example, a Health Directive, POLST, or a discussion with a medical provider or your loved ones about your wishes): Yes, advance care planning is on file.    {Hearing Test Done (Optional):445474}   Fall risk  Fallen 2 or more times in the past year?: No  Any fall with injury in the past year?: No  {If any of the above assessments are answered yes, consider ordering appropriate referrals (Optional):895984::\"click delete button to remove this line now\"}  Cognitive Screening { :112321}    Do you have sleep apnea, excessive snoring or daytime drowsiness?: no    Reviewed and updated as needed this visit by clinical staff   Tobacco   Meds              Reviewed and updated as needed this visit by Provider                 Social History     Tobacco Use     Smoking status: Never "     Smokeless tobacco: Never   Substance Use Topics     Alcohol use: Not on file     {Rooming staff  Click this link to complete the Prescreen if response below is not for today's visit  Alcohol Use Prescreen >3 drinks/day or > 7 drinks/week.  If the prescreen question answer is YES, complete the full AUDIT  :032311}        7/17/2023    10:46 AM   Alcohol Use   Prescreen: >3 drinks/day or >7 drinks/week? No   {add AUDIT responses (Optional) (A score of 7 for adult men is an indication of hazardous drinking; a score of 8 or more is an indication of an alcohol use disorder.  A score of 7 or more for adult women is an indication of hazardous drinking or an alchohol use disorder):193777}  Do you have a current opioid prescription? No  Do you use any other controlled substances or medications that are not prescribed by a provider? None  {Provider  If there are gaps in the social history shown above, please follow the link and refresh the note Link to Social and Substance History :615414}    {Outside tests to abstract? :422268}    {additional problems to add (Optional):425733}    Current providers sharing in care for this patient include: {Rooming staff:  Please update Care Team from storyboard, refresh this note and then delete this statement}  Patient Care Team:  Mitul Aldana MD as PCP - General (Internal Medicine)  Mitul Aldana MD as Assigned PCP  Mtiul Aldana MD as Referring Physician (Internal Medicine)  Nena Scruggs PA-C as Physician Assistant (Dermatology)    The following health maintenance items are reviewed in Epic and correct as of today:  Health Maintenance   Topic Date Due     HEPATITIS C SCREENING  Never done     ZOSTER IMMUNIZATION (1 of 2) Never done     COVID-19 Vaccine (3 - Pfizer series) 11/18/2021     DTAP/TDAP/TD IMMUNIZATION (3 - Td or Tdap) 12/11/2022     MEDICARE ANNUAL WELLNESS VISIT  01/17/2023     ANNUAL REVIEW OF HM ORDERS  01/17/2023     Pneumococcal  "Vaccine: 65+ Years (2 - PPSV23 if available, else PCV20) 02/18/2023     INFLUENZA VACCINE (1) 09/01/2023     FALL RISK ASSESSMENT  07/18/2024     COLORECTAL CANCER SCREENING  01/24/2025     MAMMO SCREENING  05/25/2025     LIPID  01/12/2027     ADVANCE CARE PLANNING  01/17/2027     DEXA  02/18/2037     PHQ-2 (once per calendar year)  Completed     IPV IMMUNIZATION  Aged Out     MENINGITIS IMMUNIZATION  Aged Out     {Chronicprobdata (optional):528839}  {Decision Support (Optional):046096}    FHS-7:       2/18/2022     9:21 AM 5/11/2023     3:09 PM 7/17/2023    10:48 AM   Breast CA Risk Assessment (FHS-7)   Did any of your first-degree relatives have breast or ovarian cancer? No No No   Did any of your relatives have bilateral breast cancer? No No No   Did any man in your family have breast cancer? No No No   Did any woman in your family have breast and ovarian cancer? No No No   Did any woman in your family have breast cancer before age 50 y? No No No   Do you have 2 or more relatives with breast and/or ovarian cancer? No No No   Do you have 2 or more relatives with breast and/or bowel cancer? No No No     {If any of the questions to the BCRA (FHS-7) are answered yes, consider ordering referral for genetic counseling (Optional) :958982::\"click delete button to remove this line now\"}  {AMB Mammogram Decision Support (Optional) :573371}  Pertinent mammograms are reviewed under the imaging tab.    Review of Systems   Constitutional: Negative for chills and fever.   HENT: Negative for congestion, ear pain, hearing loss and sore throat.    Eyes: Negative for pain and visual disturbance.   Respiratory: Positive for shortness of breath. Negative for cough.    Cardiovascular: Negative for chest pain, palpitations and peripheral edema.   Gastrointestinal: Negative for abdominal pain, constipation, diarrhea, heartburn, hematochezia and nausea.   Breasts:  Negative for tenderness, breast mass and discharge.   Genitourinary: " "Negative for dysuria, frequency, genital sores, hematuria, pelvic pain, urgency, vaginal bleeding and vaginal discharge.   Musculoskeletal: Positive for arthralgias and joint swelling. Negative for myalgias.   Skin: Negative for rash.   Neurological: Negative for dizziness, weakness, headaches and paresthesias.   Psychiatric/Behavioral: Negative for mood changes. The patient is not nervous/anxious.      {ROS COMP (Optional):894286}    OBJECTIVE:   /71 (BP Location: Left arm, Patient Position: Sitting, Cuff Size: Adult Regular)   Pulse 69   Temp 97.9  F (36.6  C) (Oral)   Resp 16   Ht 1.67 m (5' 5.75\")   Wt 67.7 kg (149 lb 3.2 oz)   LMP  (LMP Unknown)   SpO2 100%   BMI 24.27 kg/m   Estimated body mass index is 24.27 kg/m  as calculated from the following:    Height as of this encounter: 1.67 m (5' 5.75\").    Weight as of this encounter: 67.7 kg (149 lb 3.2 oz).  Physical Exam  {Exam (Optional) :081147}    {Diagnostic Test Results (Optional):671828::\"Diagnostic Test Results:\",\"Labs reviewed in Epic\"}    ASSESSMENT / PLAN:   {Diag Picklist:645427}    Patient has been advised of split billing requirements and indicates understanding: Yes      COUNSELING:  {Medicare Counselin}        She reports that she has never smoked. She has never used smokeless tobacco.      Appropriate preventive services were discussed with this patient, including applicable screening as appropriate for cardiovascular disease, diabetes, osteopenia/osteoporosis, and glaucoma.  As appropriate for age/gender, discussed screening for colorectal cancer, prostate cancer, breast cancer, and cervical cancer. Checklist reviewing preventive services available has been given to the patient.    Reviewed patients plan of care and provided an AVS. The {CarePlan:963737} for Norma meets the Care Plan requirement. This Care Plan has been established and reviewed with the {PATIENT, FAMILY MEMBER, CAREGIVER:703980}.    {Counseling Resources  [place] " Preventive Services Task Force  Cholesterol Screening  Health diet/nutrition  Pooled Cohorts Equation Calculator  [employer]cha's MyPlate  ASA Prophylaxis  Lung CA Screening  Osteoporosis prevention/bone health :577248}  {Breast Cancer Risk Calculator  BRCA-Related Cancer Risk Assessment FHS-7 Tool :389135}    Mitul Aldana MD  [facility]    Identified Health Risks:  {Medicare required documentation of substance and opioid use disorders screening :736084}

## 2023-07-19 DIAGNOSIS — E53.8 LOW SERUM VITAMIN B12: Primary | ICD-10-CM

## 2023-07-19 RX ORDER — CYANOCOBALAMIN 1000 UG/ML
INJECTION, SOLUTION INTRAMUSCULAR; SUBCUTANEOUS
Qty: 12 ML | Refills: 1 | Status: SHIPPED | OUTPATIENT
Start: 2023-07-19 | End: 2023-11-19

## 2023-07-20 ENCOUNTER — TELEPHONE (OUTPATIENT)
Dept: INTERNAL MEDICINE | Facility: CLINIC | Age: 68
End: 2023-07-20
Payer: MEDICARE

## 2023-07-26 NOTE — TELEPHONE ENCOUNTER
Central Prior Authorization Team   Phone: [phone]    PA Initiation    Medication: cyanocobalamin (CYANOCOBALAMIN) 1000 MCG/ML injection  Insurance Company: [employer] - Phone [phone] Fax [phone]  Pharmacy Filling the Rx: [facility] - [place], [place] - [facility] [address]  Filling Pharmacy Phone: [phone]  Filling Pharmacy Fax:    Start Date: 7/26/2023

## 2023-07-26 NOTE — TELEPHONE ENCOUNTER
PRIOR AUTHORIZATION DENIED    Medication: cyanocobalamin (CYANOCOBALAMIN) 1000 MCG/ML injection-PA DENIED     Denial Date: 7/26/2023    Denial Rational:           Appeal Information:

## 2023-08-26 ENCOUNTER — OFFICE VISIT (OUTPATIENT)
Dept: URGENT CARE | Facility: URGENT CARE | Age: 68
End: 2023-08-26
Payer: MEDICARE

## 2023-08-26 VITALS
SYSTOLIC BLOOD PRESSURE: 119 MMHG | BODY MASS INDEX: 24.4 KG/M2 | WEIGHT: 150 LBS | DIASTOLIC BLOOD PRESSURE: 64 MMHG | TEMPERATURE: 97.8 F | OXYGEN SATURATION: 99 % | RESPIRATION RATE: 16 BRPM | HEART RATE: 64 BPM

## 2023-08-26 DIAGNOSIS — L23.7 CONTACT DERMATITIS DUE TO POISON IVY: Primary | ICD-10-CM

## 2023-08-26 PROCEDURE — 99213 OFFICE O/P EST LOW 20 MIN: CPT

## 2023-08-26 RX ORDER — PREDNISONE 20 MG/1
TABLET ORAL
Qty: 10 TABLET | Refills: 0 | Status: SHIPPED | OUTPATIENT
Start: 2023-08-26 | End: 2023-09-01

## 2023-08-26 NOTE — PROGRESS NOTES
Assessment & Plan     Contact dermatitis due to poison ivy    - predniSONE (DELTASONE) 20 MG tablet; 2 tabs once daily x 5 days    Will treat with prednisone burst.  May continue topical cortisone and Benadryl po prn comfort.  Close Follow-up if no change or new or worsening sx prn.    Caroline Hawthorne MD   Urgent Care Provider  [facility] [place] Green is a 68 year old, presenting for the following health issues:  Urgent Care and Poison Ivy (Poison Ivy since Tuesday.)      HPI     Hx of repeat poison ivy.  New contact since Tuesday- continues to spread.  Not resolving with topical steroids and po Benadryl.  Increased itch- now popping out on face and around RIGHT eye.    Review of Systems   Constitutional, HEENT, cardiovascular, pulmonary, GI, , musculoskeletal, neuro, skin, endocrine and psych systems are negative, except as otherwise noted.      Objective    /64   Pulse 64   Temp 97.8  F (36.6  C) (Tympanic)   Resp 16   Wt 68 kg (150 lb)   LMP  (LMP Unknown)   SpO2 99%   BMI 24.40 kg/m    Body mass index is 24.4 kg/m .  Physical Exam   GENERAL: healthy, alert and no distress  EYES: Eyes grossly normal to inspection, PERRL and conjunctivae and sclerae normal  SKIN: spots of inflamed patches of poison ivy over hands, legs and lateral aspect of RIGHT eye today  PSYCH: mentation appears normal, affect normal/bright

## 2023-09-01 DIAGNOSIS — L23.7 CONTACT DERMATITIS DUE TO POISON IVY: ICD-10-CM

## 2023-09-01 RX ORDER — PREDNISONE 20 MG/1
TABLET ORAL
Qty: 10 TABLET | Refills: 0 | Status: SHIPPED | OUTPATIENT
Start: 2023-09-01 | End: 2024-03-25

## 2023-09-20 ENCOUNTER — OFFICE VISIT (OUTPATIENT)
Dept: PODIATRY | Facility: CLINIC | Age: 68
End: 2023-09-20
Payer: MEDICARE

## 2023-09-20 ENCOUNTER — ANCILLARY PROCEDURE (OUTPATIENT)
Dept: GENERAL RADIOLOGY | Facility: CLINIC | Age: 68
End: 2023-09-20
Attending: PODIATRIST
Payer: MEDICARE

## 2023-09-20 VITALS
HEIGHT: 66 IN | DIASTOLIC BLOOD PRESSURE: 65 MMHG | SYSTOLIC BLOOD PRESSURE: 106 MMHG | BODY MASS INDEX: 24.21 KG/M2 | HEART RATE: 76 BPM

## 2023-09-20 DIAGNOSIS — Q66.70 CONGENITAL CAVUS FOOT: Primary | ICD-10-CM

## 2023-09-20 DIAGNOSIS — G89.29 PAIN, FOOT, LEFT, CHRONIC: ICD-10-CM

## 2023-09-20 DIAGNOSIS — Q66.70 CONGENITAL CAVUS FOOT: ICD-10-CM

## 2023-09-20 DIAGNOSIS — M79.672 PAIN, FOOT, LEFT, CHRONIC: ICD-10-CM

## 2023-09-20 PROCEDURE — 73630 X-RAY EXAM OF FOOT: CPT | Mod: LT | Performed by: RADIOLOGY

## 2023-09-20 PROCEDURE — 99203 OFFICE O/P NEW LOW 30 MIN: CPT | Performed by: PODIATRIST

## 2023-09-20 NOTE — LETTER
"    9/20/2023         RE: Norma Espinosa  [address]   [place] [address]        Dear Colleague,    Thank you for referring your patient, Norma Espinosa, to the [facility]. Please see a copy of my visit note below.    Subjective:    Pt is seen today in consult from Dr. Aldana with the c/c of painful left foot.  This has been symptomatic for the past 4 years.  Pt denies any hx of trauma.  Aggravated by activity and releaved by rest.  Describes as burning pain.   Patient points to dorsum of first and second tarsometatarsal joints.  Some plantar pain as well.  Denies trauma.  What bothers us the most is continued walking.  Just slight pain with walking around the house.  She has siblings with Charcot-Marcia-Tooth disease.  She denies any weakness.    ROS:  see above       No Known Allergies    Current Outpatient Medications   Medication Sig Dispense Refill     cholecalciferol 125 MCG (5000 UT) CAPS Take 5,000 Units by mouth       cyanocobalamin (CYANOCOBALAMIN) 1000 MCG/ML injection Inject 1 mL (1,000 mcg) into the muscle daily for 5 days, THEN 1 mL (1,000 mcg) once a week for 28 days, THEN 1 mL (1,000 mcg) every 30 days for 90 days. 12 mL 1     predniSONE (DELTASONE) 20 MG tablet 2 tabs once daily x 5 days 10 tablet 0     syringe/needle, disp, 25G X 1\" 1 ML MISC Use as directed to administer intramuscular B 12 injections monthly 20 each 1       Patient Active Problem List   Diagnosis     Shoulder pain     A-V fistula (H)     Abnormal stress test     Anomalous right coronary artery     History of 2019 novel coronavirus disease (COVID-19)     History of TIA (transient ischemic attack)     Iron deficiency       No past medical history on file.    No past surgical history on file.    No family history on file.    Social History     Tobacco Use     Smoking status: Never     Smokeless tobacco: Never   Substance Use Topics     Alcohol use: Not on file         Exam:    Vitals: /65   " "Pulse 76   Ht 1.676 m (5' 6\")   LMP  (LMP Unknown)   BMI 24.21 kg/m    BMI: Body mass index is 24.21 kg/m .  Height: 5' 6\"    Constitutional/ general:  Pt is in no apparent distress, appears well-nourished.  Cooperative with history and physical exam.     Psych:  The patient answered questions appropriately.  Normal affect.  Seems to have reasonable expectations, in terms of treatment.     Lungs:  Non labored breathing, non labored speech. No cough.  No audible wheezing. Even, quiet breathing.       Vascular:  positive pedal pulses bilaterally for both the DP and PT arteries.  CFT < 3 sec.  negative ankle edema.  positive pedal hair growth.    Neuro:  Alert and oriented x 3. Coordinated gait.  Light touch sensation is intact     Derm: Normal texture and turgor.  No erythema, ecchymosis, or cyanosis.      Musculoskeletal:    Lower extremity muscle strength is normal.  Patient is ambulatory without an assistive device or brace.  No gross deformities.   Cavus foot with weightbearing bilateral.  No forefoot or rear foot deformities noted.    Normal ROM all fore foot and rearfoot joints with no pain or crepitus.  No equinus.  Pain   No edema.  No masses.  No ecchymosis.      Radiographic Exam:  X-Ray Findings:  I personally reviewed the films.  Long second metatarsal.  Decreased joint space on second tarsometatarsal joint lateral x-ray and dorsally.  Some slight bossing noted.    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  Explained mechanics causing arthritis of her left second tarsometatarsal joint.  Discussed treatment options.  Good stiff supportive shoes at all times both inside and outside and I made suggestions.  Can use Voltaren gel on this.  She has orthotics and will try to wear these in her shoes.  We will avoid activities that bother this and discussed will bother this.  Discussed physical therapy.  She will call if she would like to do this.  " RTC as needed.  Thank you for allowing me participate in the care of this patient.        Reymundo Gusman DPM, FACFAS          Again, thank you for allowing me to participate in the care of your patient.        Sincerely,        Reymundo Gusman DPM

## 2023-09-20 NOTE — PATIENT INSTRUCTIONS
We wish you continued good healing. If you have any questions or concerns, please do not hesitate to contact us at  [phone]    [employer] (secure e-mail communication and access to your chart) to send a message or to make an appointment.    Please remember to call and schedule a follow up appointment if one was recommended at your earliest convenience.     PODIATRY CLINIC HOURS  TELEPHONE NUMBER    Dr. Reymundo EATONPNBA [facility]        Clinics:  [facility]   [place]  Tuesday 1PM-6PM  [place]  Wednesday 745AM-330PM  [place]  Monday 2nd,4th  830AM-4PM  Thursday/Friday 745AM-230PM     [place] APPOINTMENTS  [phone]    [place] APPOINTMENTS  [phone]          If you need a medication refill, please contact us you may need lab work and/or a follow up visit prior to your refill (i.e. Antifungal medications).  If MRI needed please call Imaging at [phone]   HOW DO I GET MY KNEE SCOOTER? Knee scooters can be picked up at ANY Medical Supply stores with your knee scooter Prescription.  OR  Bring your signed prescription to an [facility] Medical Equipment showroom.  Call or bring in your Orthotics order to any Orthotics locations. Or call [phone]

## 2023-09-20 NOTE — PROGRESS NOTES
"Subjective:    Pt is seen today in consult from Dr. Aldana with the c/c of painful left foot.  This has been symptomatic for the past 4 years.  Pt denies any hx of trauma.  Aggravated by activity and releaved by rest.  Describes as burning pain.   Patient points to dorsum of first and second tarsometatarsal joints.  Some plantar pain as well.  Denies trauma.  What bothers us the most is continued walking.  Just slight pain with walking around the house.  She has siblings with Charcot-Marcia-Tooth disease.  She denies any weakness.    ROS:  see above       No Known Allergies    Current Outpatient Medications   Medication Sig Dispense Refill    cholecalciferol 125 MCG (5000 UT) CAPS Take 5,000 Units by mouth      cyanocobalamin (CYANOCOBALAMIN) 1000 MCG/ML injection Inject 1 mL (1,000 mcg) into the muscle daily for 5 days, THEN 1 mL (1,000 mcg) once a week for 28 days, THEN 1 mL (1,000 mcg) every 30 days for 90 days. 12 mL 1    predniSONE (DELTASONE) 20 MG tablet 2 tabs once daily x 5 days 10 tablet 0    syringe/needle, disp, 25G X 1\" 1 ML MISC Use as directed to administer intramuscular B 12 injections monthly 20 each 1       Patient Active Problem List   Diagnosis    Shoulder pain    A-V fistula (H)    Abnormal stress test    Anomalous right coronary artery    History of 2019 novel coronavirus disease (COVID-19)    History of TIA (transient ischemic attack)    Iron deficiency       No past medical history on file.    No past surgical history on file.    No family history on file.    Social History     Tobacco Use    Smoking status: Never    Smokeless tobacco: Never   Substance Use Topics    Alcohol use: Not on file         Exam:    Vitals: /65   Pulse 76   Ht 1.676 m (5' 6\")   LMP  (LMP Unknown)   BMI 24.21 kg/m    BMI: Body mass index is 24.21 kg/m .  Height: 5' 6\"    Constitutional/ general:  Pt is in no apparent distress, appears well-nourished.  Cooperative with history and physical exam.     Psych:  " The patient answered questions appropriately.  Normal affect.  Seems to have reasonable expectations, in terms of treatment.     Lungs:  Non labored breathing, non labored speech. No cough.  No audible wheezing. Even, quiet breathing.       Vascular:  positive pedal pulses bilaterally for both the DP and PT arteries.  CFT < 3 sec.  negative ankle edema.  positive pedal hair growth.    Neuro:  Alert and oriented x 3. Coordinated gait.  Light touch sensation is intact     Derm: Normal texture and turgor.  No erythema, ecchymosis, or cyanosis.      Musculoskeletal:    Lower extremity muscle strength is normal.  Patient is ambulatory without an assistive device or brace.  No gross deformities.   Cavus foot with weightbearing bilateral.  No forefoot or rear foot deformities noted.    Normal ROM all fore foot and rearfoot joints with no pain or crepitus.  No equinus.  Pain   No edema.  No masses.  No ecchymosis.      Radiographic Exam:  X-Ray Findings:  I personally reviewed the films.  Long second metatarsal.  Decreased joint space on second tarsometatarsal joint lateral x-ray and dorsally.  Some slight bossing noted.    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  Explained mechanics causing arthritis of her left second tarsometatarsal joint.  Discussed treatment options.  Good stiff supportive shoes at all times both inside and outside and I made suggestions.  Can use Voltaren gel on this.  She has orthotics and will try to wear these in her shoes.  We will avoid activities that bother this and discussed will bother this.  Discussed physical therapy.  She will call if she would like to do this.  RTC as needed.  Thank you for allowing me participate in the care of this patient.        Reymundo Gusman, ROCK, FACFAS

## 2024-03-25 ENCOUNTER — VIRTUAL VISIT (OUTPATIENT)
Dept: INTERNAL MEDICINE | Facility: CLINIC | Age: 69
End: 2024-03-25
Payer: COMMERCIAL

## 2024-03-25 ENCOUNTER — TELEPHONE (OUTPATIENT)
Dept: INTERNAL MEDICINE | Facility: CLINIC | Age: 69
End: 2024-03-25

## 2024-03-25 DIAGNOSIS — I77.0 A-V FISTULA (H): ICD-10-CM

## 2024-03-25 DIAGNOSIS — E61.1 IRON DEFICIENCY: ICD-10-CM

## 2024-03-25 DIAGNOSIS — E53.8 LOW SERUM VITAMIN B12: ICD-10-CM

## 2024-03-25 DIAGNOSIS — R19.4 CHANGE IN BOWEL HABITS: Primary | ICD-10-CM

## 2024-03-25 PROCEDURE — 99214 OFFICE O/P EST MOD 30 MIN: CPT | Mod: 95 | Performed by: INTERNAL MEDICINE

## 2024-03-25 NOTE — PATIENT INSTRUCTIONS
Colonoscopy    Labs for malabsorption and change in bowel habits    Trial of Metamucil and MiraLAX    Rule out malabsorption with TTG

## 2024-03-25 NOTE — TELEPHONE ENCOUNTER
----- Message from Mitul Aldana MD sent at 3/25/2024 10:06 AM CDT -----  Please schedule lab appointment and colonoscopy

## 2024-03-25 NOTE — PROGRESS NOTES
Norma is a 69 year old who is being evaluated via a billable video visit.    How would you like to obtain your AVS? MyChart  If the video visit is dropped, the invitation should be resent by: Text to cell phone: [phone]  Will anyone else be joining your video visit? No  {If patient encounters technical issues they should call [phone] :464447}    {PROVIDER CHARTING PREFERENCE:089273}    Subjective   Norma is a 69 year old, presenting for the following health issues:  Recheck Medication and Bowel Problems (Change in bowel movements and increased gas)      3/25/2024     8:02 AM   Additional Questions   Roomed by crystal campbell   Accompanied by self     Video Start Time: {video visit start/end time for provider to select:655549}    History of Present Illness       Reason for visit:  Changes in bowel function over the past 4 months  Symptom onset:  More than a month  Symptoms include:  Irregular bowel movements, changes in stool, increased gas, stool leakage, urges to pass bowel without success.  Symptom intensity:  Mild  Symptom progression:  Staying the same  Had these symptoms before:  No  What makes it worse:  No  What makes it better:  Mily consumes 1 sweetened beverage(s) daily.She exercises with enough effort to increase her heart rate 20 to 29 minutes per day.  She exercises with enough effort to increase her heart rate 5 days per week.   She is taking medications regularly.       {SUPERLIST (Optional):867209}  {additonal problems for provider to add (Optional):645037}    {ROS Picklists (Optional):226006}      Objective    Vitals - Patient Reported  Weight (Patient Reported): 65.8 kg (145 lb)  Pain Score: No Pain (0)        Physical Exam   {video visit exam brief selected:166858}    {Diagnostic Test Results (Optional):825061}      Video-Visit Details    Type of service:  Video Visit   Video End Time:{video visit start/end time for provider to select:447850}  Originating Location (pt. Location): {video visit  "patient location:905026::\"Home\"}  {PROVIDER LOCATION On-site should be selected for visits conducted from your clinic location or adjoining [facility] hospital, academic office, or other nearby [facility] building. Off-site should be selected for all other provider locations, including home:511547}  Distant Location (provider location):  {virtual location provider:056410}  Platform used for Video Visit: {Virtual Visit Platforms:210109::\"Deal Pepper\"}  Signed Electronically by: Mitul Aldana MD  {Email feedback regarding this note to [email or web address]   :639144}  "

## 2024-03-25 NOTE — PROGRESS NOTES
Norma is a 69 year old female contacting the clinic today via video, who will use the platform: Veotag for the visit.  Phone # for [employer], or if Amwell drops:   Telephone Information:   Mobile [phone]          ASSESSMENT and PLAN:  1. Change in bowel habits  Update labs.  Refer for colonoscopy.  Trial of fiber and stimulant  - Adult GI  Referral - Procedure Only; Future  - TSH; Future  - CBC with Platelets & Differential; Future  - Comprehensive metabolic panel; Future  - Ferritin; Future    2. A-V fistula (H24)  Noted and stable    3. Low serum vitamin B12  Rule out malabsorption  - Tissue transglutaminase shree IgA and IgG; Future    4. Iron deficiency  Recheck iron with history of iron deficiency  - Ferritin; Future       Patient Instructions   Colonoscopy    Labs for malabsorption and change in bowel habits    Trial of Metamucil and MiraLAX    Rule out malabsorption with TTG            Return in about 6 months (around 9/25/2024) for using a video visit.       CHIEF COMPLAINT:  Chief Complaint   Patient presents with    Recheck Medication    Bowel Problems     Change in bowel movements and increased gas       HISTORY OF PRESENT ILLNESS:  Norma is a 69 year old female contacting the clinic today via video for complaints of change in bowel habits.  She bowel habits have been historically normal but a few months ago became irregular.  Now complains of urge, occasional incontinence and rectal leakage, and increased gas.  No blood.  Colonoscopy many years ago normal and Cologuard 1 year ago normal.  Does not take ibuprofen or naproxen or omeprazole.  Only takes vitamins.  Labs done 1 year ago in July were normal    Has history of iron deficiency anemia.  Recent labs showed pernicious anemia.  Discussed possible upper gastrointestinal malabsorption    At her physical in July vitamin B12 level was found to be below 200.  She was initiated on intramuscular and oral shots.  She continues to take shots once  "monthly in addition to pills    History of Present Illness       Reason for visit:  Changes in bowel function over the past 4 months  Symptom onset:  More than a month  Symptoms include:  Irregular bowel movements, changes in stool, increased gas, stool leakage, urges to pass bowel without success.  Symptom intensity:  Mild  Symptom progression:  Staying the same  Had these symptoms before:  No  What makes it worse:  No  What makes it better:  Mily consumes 1 sweetened beverage(s) daily.She exercises with enough effort to increase her heart rate 20 to 29 minutes per day.  She exercises with enough effort to increase her heart rate 5 days per week.   She is taking medications regularly.      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  LMP  (LMP Unknown)  Estimated body mass index is 24.21 kg/m  as calculated from the following:    Height as of 9/20/23: 1.676 m (5' 6\").    Weight as of 8/26/23: 68 kg (150 lb).    PHYSICAL EXAM:  (observations via Video)  Alert and oriented.  No pallor    MEDICATIONS:   Current Outpatient Medications   Medication Sig Dispense Refill    cholecalciferol 125 MCG (5000 UT) CAPS Take 5,000 Units by mouth      syringe/needle, disp, 25G X 1\" 1 ML MISC Use as directed to administer intramuscular B 12 injections monthly 20 each 1       Outside Notes summarized:   Labs, x-rays, cardiology, GI tests reviewed: Low B12.  Low " "iron  Recent Labs   Lab Test 07/18/23  1100   HGB 14.0   WBC 4.6      POTASSIUM 3.9   CR 0.69   URIC 4.8   B12 186*   TSH 2.18     No results found for: \"OPBEM46YYC\"  Lab Results   Component Value Date    CHOL 215 07/18/2023     New orders:   Orders Placed This Encounter   Procedures    TSH    Comprehensive metabolic panel    Tissue transglutaminase shree IgA and IgG    Ferritin    Adult GI  Referral - Procedure Only    CBC with Platelets & Differential       Independent review of:         3/25/2024     8:02 AM   Additional Questions   Roomed by crystal campbell   Accompanied by self       Video-Visit Details  Type of service:  Video Visit  Patient has given verbal consent to a Video visit?  Yes  How would you like to obtain your AVS?  MyChart  Will anyone else be joining your video visit, giving supplemental history? No    Originating location (pt location): Home    Distant Location (provider location):  Off-site    Video Start Time: 9:50 AM  Video End time:  10:10 AM  Face to face plus orders:  21 minutes  Documentation time:  3 minutes     The visit lasted a total of 24 minutes    Mitul Aldana MD  [facility]       "

## 2024-03-29 ENCOUNTER — MYC MEDICAL ADVICE (OUTPATIENT)
Dept: INTERNAL MEDICINE | Facility: CLINIC | Age: 69
End: 2024-03-29
Payer: COMMERCIAL

## 2024-04-08 ENCOUNTER — LAB (OUTPATIENT)
Dept: LAB | Facility: CLINIC | Age: 69
End: 2024-04-08
Payer: COMMERCIAL

## 2024-04-08 DIAGNOSIS — R19.4 CHANGE IN BOWEL HABITS: ICD-10-CM

## 2024-04-08 DIAGNOSIS — E53.8 LOW SERUM VITAMIN B12: ICD-10-CM

## 2024-04-08 DIAGNOSIS — E61.1 IRON DEFICIENCY: ICD-10-CM

## 2024-04-08 LAB
ALBUMIN SERPL BCG-MCNC: 4.3 G/DL (ref 3.5–5.2)
ALP SERPL-CCNC: 64 U/L (ref 40–150)
ALT SERPL W P-5'-P-CCNC: 19 U/L (ref 0–50)
ANION GAP SERPL CALCULATED.3IONS-SCNC: 10 MMOL/L (ref 7–15)
AST SERPL W P-5'-P-CCNC: 29 U/L (ref 0–45)
BASOPHILS # BLD AUTO: 0 10E3/UL (ref 0–0.2)
BASOPHILS NFR BLD AUTO: 1 %
BILIRUB SERPL-MCNC: 0.4 MG/DL
BUN SERPL-MCNC: 10 MG/DL (ref 8–23)
CALCIUM SERPL-MCNC: 9.2 MG/DL (ref 8.8–10.2)
CHLORIDE SERPL-SCNC: 105 MMOL/L (ref 98–107)
CREAT SERPL-MCNC: 0.78 MG/DL (ref 0.51–0.95)
DEPRECATED HCO3 PLAS-SCNC: 24 MMOL/L (ref 22–29)
EGFRCR SERPLBLD CKD-EPI 2021: 83 ML/MIN/1.73M2
EOSINOPHIL # BLD AUTO: 0.2 10E3/UL (ref 0–0.7)
EOSINOPHIL NFR BLD AUTO: 4 %
ERYTHROCYTE [DISTWIDTH] IN BLOOD BY AUTOMATED COUNT: 12.3 % (ref 10–15)
FERRITIN SERPL-MCNC: 171 NG/ML (ref 11–328)
GLUCOSE SERPL-MCNC: 84 MG/DL (ref 70–99)
HCT VFR BLD AUTO: 38.7 % (ref 35–47)
HGB BLD-MCNC: 13.1 G/DL (ref 11.7–15.7)
IMM GRANULOCYTES # BLD: 0 10E3/UL
IMM GRANULOCYTES NFR BLD: 0 %
LYMPHOCYTES # BLD AUTO: 1.6 10E3/UL (ref 0.8–5.3)
LYMPHOCYTES NFR BLD AUTO: 38 %
MCH RBC QN AUTO: 30.8 PG (ref 26.5–33)
MCHC RBC AUTO-ENTMCNC: 33.9 G/DL (ref 31.5–36.5)
MCV RBC AUTO: 91 FL (ref 78–100)
MONOCYTES # BLD AUTO: 0.3 10E3/UL (ref 0–1.3)
MONOCYTES NFR BLD AUTO: 8 %
NEUTROPHILS # BLD AUTO: 2 10E3/UL (ref 1.6–8.3)
NEUTROPHILS NFR BLD AUTO: 50 %
PLATELET # BLD AUTO: 184 10E3/UL (ref 150–450)
POTASSIUM SERPL-SCNC: 4.1 MMOL/L (ref 3.4–5.3)
PROT SERPL-MCNC: 6.7 G/DL (ref 6.4–8.3)
RBC # BLD AUTO: 4.26 10E6/UL (ref 3.8–5.2)
SODIUM SERPL-SCNC: 139 MMOL/L (ref 135–145)
TSH SERPL DL<=0.005 MIU/L-ACNC: 1.7 UIU/ML (ref 0.3–4.2)
WBC # BLD AUTO: 4.1 10E3/UL (ref 4–11)

## 2024-04-08 PROCEDURE — 82728 ASSAY OF FERRITIN: CPT

## 2024-04-08 PROCEDURE — 86364 TISS TRNSGLTMNASE EA IG CLAS: CPT

## 2024-04-08 PROCEDURE — 36415 COLL VENOUS BLD VENIPUNCTURE: CPT

## 2024-04-08 PROCEDURE — 85025 COMPLETE CBC W/AUTO DIFF WBC: CPT

## 2024-04-08 PROCEDURE — 84443 ASSAY THYROID STIM HORMONE: CPT

## 2024-04-08 PROCEDURE — 80053 COMPREHEN METABOLIC PANEL: CPT

## 2024-04-10 LAB
TTG IGA SER-ACNC: 0.7 U/ML
TTG IGG SER-ACNC: 0.9 U/ML

## 2024-05-28 ENCOUNTER — TRANSFERRED RECORDS (OUTPATIENT)
Dept: HEALTH INFORMATION MANAGEMENT | Facility: CLINIC | Age: 69
End: 2024-05-28
Payer: COMMERCIAL

## 2024-09-24 DIAGNOSIS — J01.00 ACUTE NON-RECURRENT MAXILLARY SINUSITIS: Primary | ICD-10-CM

## 2024-09-24 RX ORDER — PREDNISONE 20 MG/1
20 TABLET ORAL EVERY MORNING
Qty: 4 TABLET | Refills: 0 | Status: SHIPPED | OUTPATIENT
Start: 2024-09-24 | End: 2024-09-28

## 2024-09-24 RX ORDER — GUAIFENESIN 600 MG/1
600 TABLET, EXTENDED RELEASE ORAL 2 TIMES DAILY
Qty: 60 TABLET | Refills: 2 | Status: SHIPPED | OUTPATIENT
Start: 2024-09-24 | End: 2025-09-24

## 2024-09-30 DIAGNOSIS — J01.00 ACUTE NON-RECURRENT MAXILLARY SINUSITIS: Primary | ICD-10-CM

## 2024-10-20 ENCOUNTER — HEALTH MAINTENANCE LETTER (OUTPATIENT)
Age: 69
End: 2024-10-20

## 2025-07-26 ENCOUNTER — HEALTH MAINTENANCE LETTER (OUTPATIENT)
Age: 70
End: 2025-07-26